# Patient Record
Sex: FEMALE | Race: BLACK OR AFRICAN AMERICAN
[De-identification: names, ages, dates, MRNs, and addresses within clinical notes are randomized per-mention and may not be internally consistent; named-entity substitution may affect disease eponyms.]

---

## 2023-07-10 ENCOUNTER — NURSE TRIAGE (OUTPATIENT)
Dept: OTHER | Facility: CLINIC | Age: 28
End: 2023-07-10

## 2023-11-28 ENCOUNTER — TELEPHONE (OUTPATIENT)
Dept: PRIMARY CARE | Facility: CLINIC | Age: 28
End: 2023-11-28

## 2024-08-06 ENCOUNTER — OFFICE VISIT (OUTPATIENT)
Dept: PRIMARY CARE | Facility: CLINIC | Age: 29
End: 2024-08-06
Payer: COMMERCIAL

## 2024-08-06 VITALS
HEIGHT: 64 IN | HEART RATE: 77 BPM | SYSTOLIC BLOOD PRESSURE: 118 MMHG | WEIGHT: 185 LBS | DIASTOLIC BLOOD PRESSURE: 80 MMHG | BODY MASS INDEX: 31.58 KG/M2 | OXYGEN SATURATION: 98 % | TEMPERATURE: 97.2 F

## 2024-08-06 DIAGNOSIS — Z13.6 SCREENING FOR CARDIOVASCULAR CONDITION: ICD-10-CM

## 2024-08-06 DIAGNOSIS — J45.20 MILD INTERMITTENT ASTHMA WITHOUT COMPLICATION (HHS-HCC): ICD-10-CM

## 2024-08-06 DIAGNOSIS — Z11.3 ROUTINE SCREENING FOR STI (SEXUALLY TRANSMITTED INFECTION): ICD-10-CM

## 2024-08-06 DIAGNOSIS — Z91.09 ENVIRONMENTAL ALLERGIES: ICD-10-CM

## 2024-08-06 DIAGNOSIS — T75.3XXA MOTION SICKNESS, INITIAL ENCOUNTER: ICD-10-CM

## 2024-08-06 DIAGNOSIS — E66.9 OBESITY (BMI 30-39.9): ICD-10-CM

## 2024-08-06 DIAGNOSIS — E55.9 VITAMIN D DEFICIENCY: ICD-10-CM

## 2024-08-06 DIAGNOSIS — Z00.00 ANNUAL PHYSICAL EXAM: Primary | ICD-10-CM

## 2024-08-06 DIAGNOSIS — Z71.9: ICD-10-CM

## 2024-08-06 PROCEDURE — 3008F BODY MASS INDEX DOCD: CPT | Performed by: STUDENT IN AN ORGANIZED HEALTH CARE EDUCATION/TRAINING PROGRAM

## 2024-08-06 PROCEDURE — 99214 OFFICE O/P EST MOD 30 MIN: CPT | Performed by: STUDENT IN AN ORGANIZED HEALTH CARE EDUCATION/TRAINING PROGRAM

## 2024-08-06 PROCEDURE — 1036F TOBACCO NON-USER: CPT | Performed by: STUDENT IN AN ORGANIZED HEALTH CARE EDUCATION/TRAINING PROGRAM

## 2024-08-06 PROCEDURE — 99395 PREV VISIT EST AGE 18-39: CPT | Performed by: STUDENT IN AN ORGANIZED HEALTH CARE EDUCATION/TRAINING PROGRAM

## 2024-08-06 RX ORDER — SCOLOPAMINE TRANSDERMAL SYSTEM 1 MG/1
1 PATCH, EXTENDED RELEASE TRANSDERMAL
Qty: 10 PATCH | Refills: 0 | Status: SHIPPED | OUTPATIENT
Start: 2024-08-06 | End: 2024-10-05

## 2024-08-06 RX ORDER — ALBUTEROL SULFATE 90 UG/1
2 INHALANT RESPIRATORY (INHALATION) EVERY 4 HOURS PRN
COMMUNITY
Start: 2015-06-15

## 2024-08-06 RX ORDER — CETIRIZINE HYDROCHLORIDE 10 MG/1
10 TABLET ORAL DAILY
COMMUNITY

## 2024-08-06 ASSESSMENT — PATIENT HEALTH QUESTIONNAIRE - PHQ9
SUM OF ALL RESPONSES TO PHQ9 QUESTIONS 1 AND 2: 0
2. FEELING DOWN, DEPRESSED OR HOPELESS: NOT AT ALL
1. LITTLE INTEREST OR PLEASURE IN DOING THINGS: NOT AT ALL

## 2024-08-06 ASSESSMENT — PAIN SCALES - GENERAL: PAINLEVEL: 0-NO PAIN

## 2024-08-06 NOTE — PATIENT INSTRUCTIONS
Thank you for coming to see me today.    Go to the lab for fasting blood work and urine testing, we will call you with all results.    Scopolamine patches sent to pharmacy for motion sickness.    Nutrition referral entered today, call to schedule.    Follow-up with me in the coming weeks for Pap smear, yearly for physical exam, as needed for acute concerns.

## 2024-08-06 NOTE — PROGRESS NOTES
Subjective   Dario Cox is a 29 y.o. female who presents for Annual Exam.    HPI:      This is a 29-year-old female presenting for physical exam.    Has preventative care form from [facility] air foils to complete.  This form requires completion of biometric screening including cholesterol, glucose/triglyceride levels.  She is agreeable to leave the form at our office and have us fax once labs have been completed.    PREVENTATIVE HEALTH CARE:    Immunizations:  COVID:  DUE  TDaP:  utd  Pneumonia:  utd      Immunization History   Administered Date(s) Administered    HPV, Quadrivalent 02/21/2007, 05/07/2007, 03/25/2008    Hepatitis B vaccine, 19 yrs and under (RECOMBIVAX, ENGERIX) 02/21/2007, 03/21/2007    Influenza, Unspecified 02/04/2010, 11/16/2010    MMR and varicella combined vaccine, subcutaneous (PROQUAD) 02/21/2007    Meningococcal ACWY, unspecified 03/25/2008, 05/11/2011    Novel Influenza-H1N1-09, all formulations 02/04/2010    PPD Test 10/13/2009, 06/16/2010    Pneumococcal conjugate vaccine, 20-valent (PREVNAR 20) 04/14/2023    Tdap vaccine, age 7 year and older (BOOSTRIX, ADACEL) 03/21/2007, 03/19/2017       Screenings:  HIV:  negative 10/12/2020 - utd  HCV:  negative 5/6/2023 - utd  Pap:  10/12/2020 wnl, no HPV co-testing - DUE  Mammogram: Not currently indicated  Colonoscopy: Not currently indicated    Asthma:  Well controlled- currently asymptomatic.  Pneumonia vaccination up-to-date.  Uses PRN albuterol    Environmental Allergies:  Taking Zyrtec 10 mg daily as needed.  Improved with central air recent move.      Motion Sickness:  Motion sickness with frequent work travel.  Has tried Dramamine and other oral medications in the past which make her excessively tired.  Has a trip coming up next week which will also require her to drive so she needs something that will not make her drowsy.    [place] Trip second week of September    [place] later in September as well.    Request for Nutrition  "Referral  Seeking assistance with nutrition/dietary guidance.  More sedentary with job.  Partner also has eating habits that make it harder for her to control portion sizes and other aspects of her eating that she knows she could improve.    Considering Future Pregnancy:  Considering pregnancy for the future.  Wants to make sure everything is okay.  Agreeable to routine blood work, STI screening and will return for Pap smear which she is due for as noted above.      ROS:    Review of systems is essentially negative for all systems except for any identified issues in HPI above.    Objective     /80   Pulse 77   Temp 36.2 °C (97.2 °F)   Ht 1.626 m (5' 4\")   Wt 83.9 kg (185 lb)   SpO2 98%   BMI 31.76 kg/m²      PHYSICAL EXAM    GENERAL  Well-appearing, pleasant and cooperative.  No acute distress.    HEENT  HEAD:   Normocephalic.  Atraumatic.  EYES:  PERRLA.  No scleral icterus or conjunctival injection.  EARS:  Tympanic membranes visualized bilaterally without erythema, fluid, or bulging.  NECK:  No adenopathy.  No palpable thyroid enlargement or nodules.    THROAT:  Moist oropharynx without tonsillar enlargement or exudates.    LUNGS:    Clear to auscultation bilaterally.  No wheezes, rales, rhonchi.    CARDIAC:  Regular rate and rhythm.  Normal S1S2.  No murmurs/rubs/gallops.    ABDOMEN:  Soft, non-tender, non-distended.  No hepatosplenomegaly.  Normoactive bowel sounds.    MUSCULOSKELETAL:  No gross abnormalities.   No joint swelling or erythema,.  No spinal or paraspinal tenderness to palpation.    EXTREMITIES:  No LE edema or cyanosis.      NEURO           Alert and oriented x3. No focal deficits.    PSYCH:          Affect appropriate.           Assessment/Plan   Problem List Items Addressed This Visit       Mild intermittent asthma (HHS-HCC)     Well-controlled, currently asymptomatic.  Continue albuterol as needed.          Other Visit Diagnoses       Annual physical exam    -  Primary    Has form " from [facility] air foils, will complete once labs have been completed.    Relevant Orders    TSH with reflex to Free T4 if abnormal    Lipid Panel    CBC    Comprehensive Metabolic Panel    Screening for cardiovascular condition        Relevant Orders    Lipid Panel    Vitamin D deficiency        Relevant Orders    Vitamin D 25-Hydroxy,Total (for eval of Vitamin D levels)    Motion sickness, initial encounter        Experiences with both air and car travel.  Several trips coming up in the next weeks to months.  Drowsiness with Dramamine, agreeable to try scopolamine.    Relevant Medications    scopolamine (Transderm-Scop) 1 mg over 3 days patch 3 day    Routine screening for STI (sexually transmitted infection)        Relevant Orders    C. trachomatis / N. gonorrhoeae, DNA probe    Trichomonas vaginalis, Amplified    Syphilis Screen with Reflex    HIV 1/2 Antigen/Antibody Screen with Reflex to Confirmation    Obesity (BMI 30-39.9)        Relevant Orders    Referral to Nutrition Services    Environmental allergies        Well-controlled, continue Zyrtec daily as needed.    Other counseling or consultation        Discussed future pregnancy considerations.  Advised daily prenatal vitamin, routine labs/STI screening as ordered today, and return for Pap/pelvic exam.        Counseling:   Medication education:    Education: The patient is counseled regarding potential side effects of all new medications.    Understanding:  Patient expressed understanding.    Adherence:  No barriers to adherence identified.           Haven Owens MD

## 2024-08-27 ENCOUNTER — LAB (OUTPATIENT)
Dept: LAB | Facility: LAB | Age: 29
End: 2024-08-27
Payer: COMMERCIAL

## 2024-08-27 DIAGNOSIS — Z00.00 ANNUAL PHYSICAL EXAM: ICD-10-CM

## 2024-08-27 DIAGNOSIS — Z11.3 ROUTINE SCREENING FOR STI (SEXUALLY TRANSMITTED INFECTION): ICD-10-CM

## 2024-08-27 DIAGNOSIS — E55.9 VITAMIN D DEFICIENCY: ICD-10-CM

## 2024-08-27 DIAGNOSIS — Z13.6 SCREENING FOR CARDIOVASCULAR CONDITION: ICD-10-CM

## 2024-08-27 LAB
25(OH)D3 SERPL-MCNC: 27 NG/ML (ref 30–100)
ALBUMIN SERPL BCP-MCNC: 4.1 G/DL (ref 3.4–5)
ALP SERPL-CCNC: 47 U/L (ref 33–110)
ALT SERPL W P-5'-P-CCNC: 12 U/L (ref 7–45)
ANION GAP SERPL CALC-SCNC: 13 MMOL/L (ref 10–20)
AST SERPL W P-5'-P-CCNC: 14 U/L (ref 9–39)
BILIRUB SERPL-MCNC: 0.3 MG/DL (ref 0–1.2)
BUN SERPL-MCNC: 11 MG/DL (ref 6–23)
CALCIUM SERPL-MCNC: 9.2 MG/DL (ref 8.6–10.6)
CHLORIDE SERPL-SCNC: 105 MMOL/L (ref 98–107)
CHOLEST SERPL-MCNC: 198 MG/DL (ref 0–199)
CHOLESTEROL/HDL RATIO: 2.8
CO2 SERPL-SCNC: 24 MMOL/L (ref 21–32)
CREAT SERPL-MCNC: 0.7 MG/DL (ref 0.5–1.05)
EGFRCR SERPLBLD CKD-EPI 2021: >90 ML/MIN/1.73M*2
ERYTHROCYTE [DISTWIDTH] IN BLOOD BY AUTOMATED COUNT: 13.2 % (ref 11.5–14.5)
GLUCOSE SERPL-MCNC: 91 MG/DL (ref 74–99)
HCT VFR BLD AUTO: 39.1 % (ref 36–46)
HDLC SERPL-MCNC: 72 MG/DL
HGB BLD-MCNC: 12.7 G/DL (ref 12–16)
HIV 1+2 AB+HIV1 P24 AG SERPL QL IA: NONREACTIVE
LDLC SERPL CALC-MCNC: 120 MG/DL
MCH RBC QN AUTO: 29 PG (ref 26–34)
MCHC RBC AUTO-ENTMCNC: 32.5 G/DL (ref 32–36)
MCV RBC AUTO: 89 FL (ref 80–100)
NON HDL CHOLESTEROL: 126 MG/DL (ref 0–149)
NRBC BLD-RTO: 0 /100 WBCS (ref 0–0)
PLATELET # BLD AUTO: 272 X10*3/UL (ref 150–450)
POTASSIUM SERPL-SCNC: 4.4 MMOL/L (ref 3.5–5.3)
PROT SERPL-MCNC: 7.2 G/DL (ref 6.4–8.2)
RBC # BLD AUTO: 4.38 X10*6/UL (ref 4–5.2)
SODIUM SERPL-SCNC: 138 MMOL/L (ref 136–145)
TREPONEMA PALLIDUM IGG+IGM AB [PRESENCE] IN SERUM OR PLASMA BY IMMUNOASSAY: NONREACTIVE
TRIGL SERPL-MCNC: 32 MG/DL (ref 0–149)
TSH SERPL-ACNC: 0.9 MIU/L (ref 0.44–3.98)
VLDL: 6 MG/DL (ref 0–40)
WBC # BLD AUTO: 4.6 X10*3/UL (ref 4.4–11.3)

## 2024-08-27 PROCEDURE — 86780 TREPONEMA PALLIDUM: CPT

## 2024-08-27 PROCEDURE — 80053 COMPREHEN METABOLIC PANEL: CPT

## 2024-08-27 PROCEDURE — 87491 CHLMYD TRACH DNA AMP PROBE: CPT

## 2024-08-27 PROCEDURE — 87591 N.GONORRHOEAE DNA AMP PROB: CPT

## 2024-08-27 PROCEDURE — 84443 ASSAY THYROID STIM HORMONE: CPT

## 2024-08-27 PROCEDURE — 85027 COMPLETE CBC AUTOMATED: CPT

## 2024-08-27 PROCEDURE — 80061 LIPID PANEL: CPT

## 2024-08-27 PROCEDURE — 87389 HIV-1 AG W/HIV-1&-2 AB AG IA: CPT

## 2024-08-27 PROCEDURE — 36415 COLL VENOUS BLD VENIPUNCTURE: CPT

## 2024-08-27 PROCEDURE — 87661 TRICHOMONAS VAGINALIS AMPLIF: CPT

## 2024-08-27 PROCEDURE — 82306 VITAMIN D 25 HYDROXY: CPT

## 2024-08-28 LAB
C TRACH RRNA SPEC QL NAA+PROBE: NEGATIVE
N GONORRHOEA DNA SPEC QL PROBE+SIG AMP: NEGATIVE
T VAGINALIS RRNA SPEC QL NAA+PROBE: NEGATIVE

## 2024-08-28 RX ORDER — ERGOCALCIFEROL 1.25 MG/1
50000 CAPSULE ORAL
Qty: 12 CAPSULE | Refills: 0 | Status: SHIPPED | OUTPATIENT
Start: 2024-08-28 | End: 2024-11-20

## 2024-09-24 NOTE — PROGRESS NOTES
Nutrition Initial Assessment:     Patient Dario Cox is a 29 y.o. female being seen via virtual visit, phone call only who was referred by Haven Owens MD  on 8/6/24 for   1. Obesity (BMI 30-39.9)            Nutrition Assessment    Patient Active Problem List   Diagnosis    Mild intermittent asthma ([facility])     Nutrition History:  Food & Nutrition History: Recently moved in with her boyfriend, recently changed jobs to a more sedentary job and has noticed her eating habits have changed and she has gained some wt. Also travels a lot for work, recently has been to [place] and [place]. Looking for some guidance on some healthy eating habits to help improve her health and help with elevated cholesterol.  Food Allergies: none  Food Intolerances: lactose intolerant -- drinks almond milk & oat milk; sometimes avocado (plain) causes stomach ache & same with ground turkey  Vitamin/mineral intake: D, Multivitamin     Prescription medications:    GI Symptoms: none; Occurring >2 weeks?    Oral Problems: denies; Dentition: own  Sleep Habits: 5-6 hrs disrupted, 7+ hrs disrupted (9-10pm; wakes up 4:15am when boyfriend goes to the gym but gets out of bed at 5am)    Diet Recall:  Meal 1: B (8am): fresh fruit with a snack pack trail mix (dried fruit & almonds and cashews)  Meal 2: L (11-12pm): varies, can be a smoothie or a chicken wrap or a chicken sandwich or leftovers  Meal 3: D (6-8pm) rice, veggie (spinach, broccoli, green beans, squash/zucchini), protein (chicken, salmon or shrimp)  Snacks: snacks on trail mix sometimes  Food Variety: Present  Oral Nutrition Supplement Use:  (none)  Fluid Intake: water, smoothies, occasional energy drink (usually before the gym), occasional tequila or wine (1 bottle of wine will last a month)  Energy Intake: Good > 75 %    Food Preparation:  Cooking: Patient, Spouse/Significant Other  Grocery Shopping: Patient  Dining Out: 1 to 3 times a week (when at home will eat out 1 to 2  "times per week; when traveling for work will eat out often)    Physical Activity:   Goes to the gym maybe 2 days a week, sometimes may go 4 days. Does a mixture of Aerobics and strength training.  Consistency: No    Patient self identified challenges to dietary/lifestyle changes: work life balance, does not like to eat leftovers past 2 days (makes meal prepping difficult)    Food Security/Insecurity: Food / Nutrition Program Participation:  (no issues)    Anthropometrics:  Height: 162.6 cm (5' 4.02\")   Weight: 83.9 kg (184 lb 15.5 oz) (wt from 8/6/24 in EMR)   BMI (Calculated): 31.73    IBW/kg (Dietitian Calculated): 54.6 kg Percent of IBW: 154 %     Adjusted Body Weight (kg): 61.9 kg    Weight History:   Daily Weight  09/25/24 : 83.9 kg (184 lb 15.5 oz)  08/06/24 : 83.9 kg (185 lb)  05/15/24 : 79.4 kg (175 lb 0.7 oz)  04/11/24 : 79.4 kg (175 lb 0.7 oz)  04/14/23 : 73.5 kg (162 lb)  12/22/21 : 73.9 kg (163 lb)  12/17/21 : 73.9 kg (163 lb)  10/13/21 : 74.5 kg (164 lb 3.2 oz)  06/28/21 : 74.5 kg (164 lb 3.2 oz)  06/08/21 : 74.4 kg (164 lb)    Weight Change %:  Significant Weight Loss: No    Nutrition Focused Physical Exam Findings:  Performed/Deferred: Deferred due to be being virtual visit      Nutrition Significant Labs:  CMP trend:    Recent Labs     08/27/24  0754 05/06/23  0951 10/12/20  1602   GLUCOSE 91 89 98    139 139   K 4.4 4.4 3.9    105 101   CO2 24 22* 23*   ANIONGAP 13 12 15   BUN 11 11 19   CREATININE 0.70 0.6 0.6   EGFR >90 124 127   CALCIUM 9.2 8.9 9.4   ALBUMIN 4.1 4.0 4.7   ALKPHOS 47 49 60   PROT 7.2 7.1 7.9   AST 14 17 18   BILITOT 0.3 0.3 0.2   ALT 12 11 18   , Lipid Panel trend:    Recent Labs     08/27/24  0754 05/06/23  0951 10/12/20  1602   CHOL 198 190 214*   HDL 72.0 90 84   LDLCALC 120* 94 118   VLDL 6  --   --    TRIG 32 30* 61   , Hgb A1c trend:   Recent Labs     10/12/20  1602   HGBA1C 5.6   , Vit D:   Lab Results   Component Value Date    VITD25 27 (L) 08/27/2024    , Iron " "Panel: No results found for: \"IRON\", \"TIBC\", \"FERRITIN\" , Vit B12: No results found for: \"LYJLPZVX26\" , and Folate: No results found for: \"FOLATE\"     Nutrition Specific Medications:  Current Outpatient Medications   Medication Instructions    albuterol (Ventolin HFA) 90 mcg/actuation inhaler 2 puffs, inhalation, Every 4 hours PRN    cetirizine (ZYRTEC) 10 mg, oral, Daily    ergocalciferol (VITAMIN D-2) 50,000 Units, oral, Once Weekly    scopolamine (Transderm-Scop) 1 mg over 3 days patch 3 day 1 patch, transdermal, Every 72 hours PRN        Estimated Needs: Did not discuss today    ;     ;         Nutrition Diagnosis   Malnutrition Diagnosis  Patient has Malnutrition Diagnosis: No    Nutrition Diagnosis  Patient has Nutrition Diagnosis: Yes  Diagnosis Status (1): New  Nutrition Diagnosis 1: Food and nutrition related knowledge deficit  Related to (1): limited or lack of prior nutrition-related education from a nutritional professional  As Evidenced by (1): per pt report looking for guidance on healthy eating habits       Nutrition Interventions/Recommendations   Nutrition Prescription: General healthy diet    Nutrition Interventions:   Food and Nutrient Delivery: Meals & Snacks: Energy-modified diet, General Healthful Diet  Goals: 3 balanced meals a day with 1-2 balanced snacks as needed     Coordination of Care:       Nutrition Education:   Nutrition Education Content: Content related nutrition education  Goals: Balanced meals using plate method, timing of meals, adequate hydration, benefits of exercise    Nutrition Education Topics Discussed:   Provided Keys for a Healthy Lifestyle Handout. Discussed the following:  Start a daily exercise routine. Adults should target 150 minutes of moderate intensity exercise each week. Start slow and work your way up to this amount. Provided examples of aerobic, resistance, and stretching/balance activities.  Plan balanced meals. The “Plate Method” is a tool used to plan " "balanced meals. Aim for the following:  One-third to half the plate (or the meal) should be a non-starchy vegetable. Non-starchy vegetables include broccoli, cauliflower, salad greens, carrots, cucumbers, asparagus, green beans, tomatoes, and many more.  One-third to a quarter of the plate should be a lean protein. Lean proteins include chicken, turkey, fish, lean beef, eggs, nuts, tofu.  One third to a quarter of the plate can be a complex starch or carbohydrate. Examples of complex starches / carbohydrates include starchy vegetables (potatoes, peas, corn, and butternut squash), grains (whole wheat bread, quinoa, and brown rice), legumes (lentils and beans), and fruit.  Olive oil should be the primary fat source used for cooking.  Use a 9-10 inch plate to help manage portions  Pre-plan meal ideas. Spending time planning upfront saves you from relying on convenience foods. It can also help you save money! Your plan does not need to be rigid, but you should have some idea of what meals you are going to make going into the week. Place this information on the refrigerator in plain sight. There are many products you can purchase to help keep you organized.   Stay hydrated with water or other lower calorie beverages. We often confuse our body's signal for thirst with being hungry. Make sure you are staying hydrated, preferably with water. The best indicator of hydration is your urine. It should be a pale yellow. Provided examples of sugar-free beverages and ways to flavor water.   Pay attention to your body's hunger and fullness cues. Introduced patient to using a scale of 1-10 to rate hunger / satiety. Reviewed signs of hunger that patient might or might not feel, and encouraged being attentive to these signals if they are present. Encouraged patient to eat when feeling a \"3-4\" on the scale instead of waiting for hunger to become more severe. Encouraged patient to aim to stop eating when feeling at a \"6\" (satisfied, but " "could eat a little more) or a \"7\" (full but not uncomfortable). Recommended eating slowly and checking in with body to determine when body is really full. Discussed that it takes the brain around 10-15 minutes after eating to feel full. Encouraged using this method in conjunction with balanced foods on the plate using the Plate Method.    Discussed importance of consistent meal pattern   Discussed how eating consistently and balanced meals help improve satiety, boot metabolism and helps to improve blood glucose levels   Encouraged pt to eat first meal of the day within 1-2hrs after waking up to help boost metabolism   Encouraged meals and snacks to be  throughout the day with no more than a 3-4hr gap in between to help boost metabolism      Educational Handouts Provided: [facility] Balanced Breakfast, High Protein Meal Ideas, High Protein Snack Ideas, Healthy Snack Blueprint Handout , and Keys for a Healthy Lifestyle     Nutrition Counseling: Strategies: Nutrition counseling based on motivational interviewing strategy, Nutrition counseling based on goal setting strategy    Patient Goals: 1. Pt will aim to have a balanced snack around 3pm 2 days a week    Readiness to Change : Good  Level of Understanding : Good  Anticipated Compliant : Good         Nutrition Monitoring and Evaluation   Food/Nutrient Related History Monitoring  Monitoring and Evaluation Plan: Meal/snack pattern  Meal/Snack Pattern: Estimated meal and snack pattern  Criteria: Consume 3 balanced meals per day using plate method & 1-2 balanced snacks a day       Body Composition/Growth/Weight History  Monitoring and Evaluation Plan: Weight  Weight: Measured weight  Criteria: Intentional weight loss of 0.5-2 lb per week, trending toward a clinically significant weight loss of 5-10% of current body weight.              Follow Up: 6-8 weeks; requested appointment date & time sent to Nutrition Admin for scheduling       Time Spent  Prep time on day of " patient encounter: 5 minutes  Time spent directly with patient, family or caregiver: 51 minutes  Documentation Time: 5 minutes

## 2024-09-25 ENCOUNTER — TELEMEDICINE CLINICAL SUPPORT (OUTPATIENT)
Dept: NUTRITION | Facility: HOSPITAL | Age: 29
End: 2024-09-25
Payer: COMMERCIAL

## 2024-09-25 VITALS — BODY MASS INDEX: 31.58 KG/M2 | HEIGHT: 64 IN | WEIGHT: 184.97 LBS

## 2024-09-25 DIAGNOSIS — E66.9 OBESITY (BMI 30-39.9): Primary | ICD-10-CM

## 2024-09-25 PROCEDURE — 97802 MEDICAL NUTRITION INDIV IN: CPT | Mod: 95

## 2024-10-09 ENCOUNTER — OFFICE VISIT (OUTPATIENT)
Dept: URGENT CARE | Age: 29
End: 2024-10-09
Payer: COMMERCIAL

## 2024-10-09 VITALS
BODY MASS INDEX: 31.76 KG/M2 | HEART RATE: 69 BPM | HEIGHT: 64 IN | DIASTOLIC BLOOD PRESSURE: 74 MMHG | WEIGHT: 186 LBS | SYSTOLIC BLOOD PRESSURE: 113 MMHG | TEMPERATURE: 98.2 F

## 2024-10-09 DIAGNOSIS — J45.21 MILD INTERMITTENT ASTHMA WITH ACUTE EXACERBATION (HHS-HCC): Primary | ICD-10-CM

## 2024-10-09 RX ORDER — ALBUTEROL SULFATE 90 UG/1
2 INHALANT RESPIRATORY (INHALATION)
Qty: 8 G | Refills: 2 | Status: SHIPPED | OUTPATIENT
Start: 2024-10-09 | End: 2025-10-09

## 2024-10-09 RX ORDER — PREDNISONE 20 MG/1
60 TABLET ORAL DAILY
Qty: 18 TABLET | Refills: 0 | Status: SHIPPED | OUTPATIENT
Start: 2024-10-09 | End: 2024-10-15

## 2024-10-09 ASSESSMENT — PAIN SCALES - GENERAL: PAINLEVEL: 6

## 2024-10-09 ASSESSMENT — ENCOUNTER SYMPTOMS
CONSTITUTIONAL NEGATIVE: 1
CHEST TIGHTNESS: 1
CARDIOVASCULAR NEGATIVE: 1

## 2024-11-04 ENCOUNTER — APPOINTMENT (OUTPATIENT)
Dept: NUTRITION | Facility: HOSPITAL | Age: 29
End: 2024-11-04
Payer: COMMERCIAL

## 2024-11-13 DIAGNOSIS — E55.9 VITAMIN D DEFICIENCY: ICD-10-CM

## 2024-11-13 RX ORDER — ERGOCALCIFEROL 1.25 MG/1
50000 CAPSULE ORAL
Qty: 12 CAPSULE | Refills: 0 | OUTPATIENT
Start: 2024-11-17

## 2025-03-17 ENCOUNTER — TELEPHONE (OUTPATIENT)
Dept: PRIMARY CARE | Facility: CLINIC | Age: 30
End: 2025-03-17

## 2025-03-17 ENCOUNTER — OFFICE VISIT (OUTPATIENT)
Dept: PRIMARY CARE | Facility: CLINIC | Age: 30
End: 2025-03-17
Payer: COMMERCIAL

## 2025-03-17 VITALS
TEMPERATURE: 97.9 F | HEIGHT: 64 IN | DIASTOLIC BLOOD PRESSURE: 78 MMHG | HEART RATE: 89 BPM | BODY MASS INDEX: 33.87 KG/M2 | SYSTOLIC BLOOD PRESSURE: 124 MMHG | WEIGHT: 198.4 LBS | OXYGEN SATURATION: 99 %

## 2025-03-17 DIAGNOSIS — N94.89 MENSTRUATION, SUPPRESSION: Primary | ICD-10-CM

## 2025-03-17 DIAGNOSIS — G43.909 MIGRAINE WITHOUT STATUS MIGRAINOSUS, NOT INTRACTABLE, UNSPECIFIED MIGRAINE TYPE: ICD-10-CM

## 2025-03-17 PROCEDURE — 3008F BODY MASS INDEX DOCD: CPT | Performed by: STUDENT IN AN ORGANIZED HEALTH CARE EDUCATION/TRAINING PROGRAM

## 2025-03-17 PROCEDURE — 99214 OFFICE O/P EST MOD 30 MIN: CPT | Performed by: STUDENT IN AN ORGANIZED HEALTH CARE EDUCATION/TRAINING PROGRAM

## 2025-03-17 RX ORDER — SUMATRIPTAN SUCCINATE 50 MG/1
50 TABLET ORAL ONCE AS NEEDED
Qty: 10 TABLET | Refills: 0 | Status: SHIPPED | OUTPATIENT
Start: 2025-03-17 | End: 2026-03-17

## 2025-03-17 RX ORDER — NORETHINDRONE 5 MG/1
TABLET ORAL
Qty: 30 TABLET | Refills: 0 | Status: SHIPPED | OUTPATIENT
Start: 2025-03-17

## 2025-03-17 ASSESSMENT — PATIENT HEALTH QUESTIONNAIRE - PHQ9
2. FEELING DOWN, DEPRESSED OR HOPELESS: NOT AT ALL
1. LITTLE INTEREST OR PLEASURE IN DOING THINGS: NOT AT ALL
SUM OF ALL RESPONSES TO PHQ9 QUESTIONS 1 AND 2: 0

## 2025-03-17 ASSESSMENT — PAIN SCALES - GENERAL: PAINLEVEL_OUTOF10: 0-NO PAIN

## 2025-03-17 NOTE — PROGRESS NOTES
"Subjective   Dario Cox is a 29 y.o. female who presents for discuss new medication options.    HPI:      This is a 29-year-old female presenting to discuss menstrual suppression for upcoming vacation as well as recent headache symptoms.  She was originally scheduled to also have her Pap smear today, however she is currently on her period and will defer this to a later date.    Menstrual Suppression  Upcoming travel from 4/7 - 4/12, scheduled to get her.  On 4/7/2025.  Has never taken medication to suppress her.  Previously, not currently on any contraception.    Migraines:  History of 2 migraines in the past year.  Most severe occurred last fall and was associated with vomiting.  Had another episode a couple of months later but took Tylenol right away and drink more water which made it less severe.  Headaches are associated with light sensitivity but no aura.  She was evaluated for this issue by a neurologist last year who offered her a prescription to help with headache symptoms which she declined.  She would like to pursue medication management at this time.  Headaches are associated with light sensitivity but no aura     Seen by neurologist last year, offered HA medication at that time but declined.    CT 9/4 without acute findings per pt report.    ROS:   Review of systems is essentially negative for all systems except for any identified issues in HPI above.    Objective     /78   Pulse 89   Temp 36.6 °C (97.9 °F)   Ht 1.626 m (5' 4\")   Wt 90 kg (198 lb 6.4 oz)   SpO2 99%   BMI 34.06 kg/m²      PHYSICAL EXAM    GENERAL  Well-appearing, pleasant and cooperative.  No acute distress.    HEENT  HEAD:   Normocephalic.  Atraumatic.  EYES:  PERRLA.  No scleral icterus or conjunctival injection.  EARS:  Tympanic membranes visualized bilaterally without erythema, fluid, or bulging.  NECK:  No adenopathy.  No palpable thyroid enlargement or nodules.    THROAT:  Moist oropharynx without tonsillar " enlargement or exudates.    LUNGS:    Clear to auscultation bilaterally.  No wheezes, rales, rhonchi.    CARDIAC:  Regular rate and rhythm.  Normal S1S2.  No murmurs/rubs/gallops.    ABDOMEN:  Soft, non-tender, non-distended.  No hepatosplenomegaly.  Normoactive bowel sounds.    MUSCULOSKELETAL:  No gross abnormalities.     EXTREMITIES:  No LE edema or cyanosis.      NEURO           Alert and oriented x3. CN II-XII grossly intact.  No focal deficits.    PSYCH:          Affect appropriate.           Assessment/Plan   Problem List Items Addressed This Visit    None  Visit Diagnoses       Menstruation, suppression    -  Primary    Relevant Medications    norethindrone (Aygestin) 5 mg tablet    Migraine without status migrainosus, not intractable, unspecified migraine type        Relevant Medications    SUMAtriptan (Imitrex) 50 mg tablet          Counseling:   Medication education:    Education: The patient is counseled regarding potential side effects of all new medications.    Understanding:  Patient expressed understanding.    Adherence:  No barriers to adherence identified.         Haven Owens MD

## 2025-03-17 NOTE — PROGRESS NOTES
Pt states she does not want to do a pap smear due to menstruation. She wants to discuss a pill to delay her period for vacation. She wants to discuss migraine pills as well.

## 2025-03-17 NOTE — PATIENT INSTRUCTIONS
Thank you for coming to see me today.    Norethindrone acetate prescription sent to pharmacy for period suppression.  Take as prescribed.  Your period to start shortly after stopping this medication.    Sumatriptan (generic Imitrex) sent to pharmacy for migraines.    Follow-up with me when not on your period for Pap smear, as needed for acute concerns.

## 2025-03-18 NOTE — TELEPHONE ENCOUNTER
Recent visit yesterday 3/17 was not a physical.  Her last physical with me was on 8/6/2024, not due until August.    I just closed the chart this afternoon - billed as an OV [id number].

## 2025-03-22 DIAGNOSIS — N94.89 MENSTRUATION, SUPPRESSION: ICD-10-CM

## 2025-03-24 RX ORDER — NORETHINDRONE 5 MG/1
TABLET ORAL
Qty: 84 TABLET | Refills: 1 | OUTPATIENT
Start: 2025-03-24

## 2025-06-28 ENCOUNTER — OFFICE VISIT (OUTPATIENT)
Dept: URGENT CARE | Age: 30
End: 2025-06-28
Payer: COMMERCIAL

## 2025-06-28 VITALS
HEIGHT: 64 IN | TEMPERATURE: 98.6 F | WEIGHT: 200 LBS | BODY MASS INDEX: 34.15 KG/M2 | RESPIRATION RATE: 20 BRPM | HEART RATE: 82 BPM | DIASTOLIC BLOOD PRESSURE: 64 MMHG | SYSTOLIC BLOOD PRESSURE: 111 MMHG | OXYGEN SATURATION: 99 %

## 2025-06-28 DIAGNOSIS — N89.8 VAGINAL IRRITATION: Primary | ICD-10-CM

## 2025-06-28 LAB
POC APPEARANCE, URINE: ABNORMAL
POC BILIRUBIN, URINE: NEGATIVE
POC BLOOD, URINE: NEGATIVE
POC COLOR, URINE: YELLOW
POC GLUCOSE, URINE: NEGATIVE MG/DL
POC KETONES, URINE: NEGATIVE MG/DL
POC LEUKOCYTES, URINE: NEGATIVE
POC NITRITE,URINE: NEGATIVE
POC PH, URINE: 6 PH
POC PROTEIN, URINE: NEGATIVE MG/DL
POC SPECIFIC GRAVITY, URINE: 1.02
POC UROBILINOGEN, URINE: 0.2 EU/DL

## 2025-06-28 PROCEDURE — 1036F TOBACCO NON-USER: CPT | Performed by: NURSE PRACTITIONER

## 2025-06-28 PROCEDURE — 99213 OFFICE O/P EST LOW 20 MIN: CPT | Performed by: NURSE PRACTITIONER

## 2025-06-28 PROCEDURE — 3008F BODY MASS INDEX DOCD: CPT | Performed by: NURSE PRACTITIONER

## 2025-06-28 PROCEDURE — 81003 URINALYSIS AUTO W/O SCOPE: CPT | Performed by: NURSE PRACTITIONER

## 2025-06-28 NOTE — PROGRESS NOTES
"Subjective   Patient ID: Dario Cox is a 30 y.o. female. They present today with a chief complaint of No chief complaint on file..    History of Present Illness  Dario Cox is a 30 y.o. female who presents to the clinic for 1 day of vaginal irritation.  Patient denies any discharge.  Patient stated there is a slight odor. Pt denies any chest pain, sob, N/V at this time in clinic.             Past Medical History  Allergies as of 06/28/2025    (No Known Allergies)       Prescriptions Prior to Admission[1]     Medical History[2]    Surgical History[3]     reports that she has never smoked. She has never used smokeless tobacco. She reports current alcohol use.    Review of Systems  Review of Systems   Genitourinary:         Vaginal irritation   All other systems reviewed and are negative.                                 Objective    Vitals:    06/28/25 1422   BP: 111/64   Pulse: 82   Resp: 20   Temp: 37 °C (98.6 °F)   SpO2: 99%   Weight: 90.7 kg (200 lb)   Height: 1.626 m (5' 4\")     No LMP recorded.    Physical Exam  Constitutional:       Appearance: Normal appearance.   Abdominal:      General: Abdomen is flat. Bowel sounds are normal. There is no distension.      Palpations: Abdomen is soft.      Tenderness: There is no abdominal tenderness. There is no right CVA tenderness, left CVA tenderness, guarding or rebound.   Neurological:      General: No focal deficit present.      Mental Status: She is alert and oriented to person, place, and time. Mental status is at baseline.   Psychiatric:         Mood and Affect: Mood normal.         Behavior: Behavior normal.         Procedures    Point of Care Test & Imaging Results from this visit  Results for orders placed or performed in visit on 06/28/25   POCT UA Automated manually resulted   Result Value Ref Range    POC Color, Urine Yellow Straw, Yellow, Light-Yellow    POC Appearance, Urine Cloudy (A) Clear    POC Glucose, Urine NEGATIVE NEGATIVE mg/dl    " POC Bilirubin, Urine NEGATIVE NEGATIVE    POC Ketones, Urine NEGATIVE NEGATIVE mg/dl    POC Specific Gravity, Urine 1.020 1.005 - 1.035    POC Blood, Urine NEGATIVE NEGATIVE    POC PH, Urine 6.0 No Reference Range Established PH    POC Protein, Urine NEGATIVE NEGATIVE mg/dl    POC Urobilinogen, Urine 0.2 0.2, 1.0 EU/DL    Poc Nitrite, Urine NEGATIVE NEGATIVE    POC Leukocytes, Urine NEGATIVE NEGATIVE      Imaging  No results found.    Cardiology, Vascular, and Other Imaging  No other imaging results found for the past 2 days      Diagnostic study results (if any) were reviewed by SHANON Bustos.    Assessment/Plan   Allergies, medications, history, and pertinent labs/EKGs/Imaging reviewed by SHANON Bustos.     Medical Decision Making  Patient denies any chance of an STI.  Will send out BV/yeast.  If anything positive we will call and start antibiotic/antifungal treatment.  Urine culture be sent out tonight if there is growth we will call start antibiotic therapy.  Advised her to follow with her primary care doctor next 5 to 7 days.  Advised patient worsening symptoms go to local emerged part for further evaluation.  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

## 2025-06-28 NOTE — PATIENT INSTRUCTIONS
Urine culture send out-if there is growth we will consider antibiotic therapy.  BV/yeast send out-if positive we will call and start antifungal or antibiotic.  Please call your primary care doctor next 5 to 7 days.  If worsening symptoms, please go to local emergency department for further evaluation.    Please follow up with your primary provider within one week if symptoms do not improve.  You may schedule an appointment online at [email or web address] or call [phone]. Go to the Emergency Department if symptoms significantly worsen or if you develop chest pain or shortness of breath.

## 2025-06-29 LAB
BACTERIA UR CULT: NORMAL
BV SCORE VAG QL: ABNORMAL

## 2025-06-30 ENCOUNTER — TELEPHONE (OUTPATIENT)
Dept: URGENT CARE | Age: 30
End: 2025-06-30

## 2025-06-30 DIAGNOSIS — B96.89 BACTERIAL VAGINOSIS: Primary | ICD-10-CM

## 2025-06-30 DIAGNOSIS — N76.0 BACTERIAL VAGINOSIS: Primary | ICD-10-CM

## 2025-06-30 LAB
BACTERIA UR CULT: NORMAL
BV SCORE VAG QL: ABNORMAL

## 2025-06-30 RX ORDER — METRONIDAZOLE 500 MG/1
500 TABLET ORAL 2 TIMES DAILY
Qty: 14 TABLET | Refills: 0 | Status: SHIPPED | OUTPATIENT
Start: 2025-06-30 | End: 2025-07-07

## 2025-06-30 NOTE — TELEPHONE ENCOUNTER
Result Communication    Resulted Orders   POCT UA Automated manually resulted   Result Value Ref Range    POC Color, Urine Yellow Straw, Yellow, Light-Yellow    POC Appearance, Urine Cloudy (A) Clear    POC Glucose, Urine NEGATIVE NEGATIVE mg/dl    POC Bilirubin, Urine NEGATIVE NEGATIVE    POC Ketones, Urine NEGATIVE NEGATIVE mg/dl    POC Specific Gravity, Urine 1.020 1.005 - 1.035    POC Blood, Urine NEGATIVE NEGATIVE    POC PH, Urine 6.0 No Reference Range Established PH    POC Protein, Urine NEGATIVE NEGATIVE mg/dl    POC Urobilinogen, Urine 0.2 0.2, 1.0 EU/DL    Poc Nitrite, Urine NEGATIVE NEGATIVE    POC Leukocytes, Urine NEGATIVE NEGATIVE   Urine Culture   Result Value Ref Range    CULTURE, URINE, ROUTINE SEE NOTE       Comment:          CULTURE, URINE, ROUTINE         Micro Number:      [id number]    Test Status:       Final    Specimen Source:   Clean catch/voided    Specimen Quality:  Adequate    Result:            Less than 10,000 CFU/mL of single Gram positive                       organism isolated. No further testing will be                       performed. If clinically indicated, recollection                       using a method to minimize contamination, with                       prompt transfer to Urine Culture Transport Tube,                       is recommended.     Vaginitis Gram Stain For Bacterial Vaginosis + Yeast   Result Value Ref Range    BV SMEAR JENNIFER SCORE SEE NOTE (A)       Comment:          BV SMEAR JENNIFER SCORE         Micro Number:      [id number]    Test Status:       Final    Specimen Source:   Swab    Specimen Quality:  Adequate    Gram Stain:        Consistent with Bacterial Vaginosis      Comment:           No yeast seen         3:37 PM I called to notify and treat patient for BV.      Results were not successfully communicated with the patient and they did not acknowledge their understanding.